# Patient Record
Sex: MALE | Race: WHITE | NOT HISPANIC OR LATINO | Employment: OTHER | URBAN - METROPOLITAN AREA
[De-identification: names, ages, dates, MRNs, and addresses within clinical notes are randomized per-mention and may not be internally consistent; named-entity substitution may affect disease eponyms.]

---

## 2022-02-10 ENCOUNTER — TELEPHONE (OUTPATIENT)
Dept: HEALTH INFORMATION MANAGEMENT | Facility: OTHER | Age: 72
End: 2022-02-10

## 2022-02-10 NOTE — TELEPHONE ENCOUNTER
Person at this # is not accepting calls at this time-   Called PT to schedule COMPREHENSIVE HEALTH ASSESSMENT, unable to LVM

## 2022-08-02 ENCOUNTER — TELEPHONE (OUTPATIENT)
Dept: HEALTH INFORMATION MANAGEMENT | Facility: OTHER | Age: 72
End: 2022-08-02

## 2022-11-09 ENCOUNTER — DOCUMENTATION (OUTPATIENT)
Dept: HEALTH INFORMATION MANAGEMENT | Facility: OTHER | Age: 72
End: 2022-11-09
Payer: MEDICARE

## 2023-05-05 ENCOUNTER — TELEPHONE (OUTPATIENT)
Dept: HEALTH INFORMATION MANAGEMENT | Facility: OTHER | Age: 73
End: 2023-05-05
Payer: MEDICARE

## 2023-06-16 PROBLEM — I77.811 AORTIC ECTASIA, ABDOMINAL (HCC): Status: ACTIVE | Noted: 2023-06-16

## 2023-06-16 PROBLEM — I72.3 ANEURYSM OF INTERNAL ILIAC ARTERY (HCC): Status: ACTIVE | Noted: 2023-06-16

## 2023-06-16 PROBLEM — R12 HEARTBURN: Status: ACTIVE | Noted: 2023-06-16

## 2023-06-16 PROBLEM — Z87.19 HISTORY OF GI BLEED: Status: ACTIVE | Noted: 2023-06-16

## 2023-06-16 PROBLEM — H93.19 TINNITUS: Status: ACTIVE | Noted: 2023-06-16

## 2023-06-16 PROBLEM — I70.0 AORTIC ATHEROSCLEROSIS (HCC): Status: ACTIVE | Noted: 2023-06-16

## 2023-06-16 PROBLEM — E66.9 CLASS 1 OBESITY IN ADULT: Status: ACTIVE | Noted: 2023-06-16

## 2023-06-16 PROBLEM — E66.811 CLASS 1 OBESITY IN ADULT: Status: ACTIVE | Noted: 2023-06-16

## 2023-06-16 PROBLEM — I73.00 RAYNAUD'S DISEASE WITHOUT GANGRENE: Status: ACTIVE | Noted: 2023-06-16

## 2023-07-13 ENCOUNTER — TELEPHONE (OUTPATIENT)
Dept: MEDICAL GROUP | Facility: LAB | Age: 73
End: 2023-07-13
Payer: MEDICARE

## 2023-07-13 RX ORDER — POLYETHYLENE GLYCOL 3350 17 G/17G
17 POWDER, FOR SOLUTION ORAL
COMMUNITY
Start: 2023-02-08 | End: 2023-07-13

## 2023-07-13 RX ORDER — LANOLIN ALCOHOL/MO/W.PET/CERES
1 CREAM (GRAM) TOPICAL 2 TIMES DAILY WITH MEALS
COMMUNITY
Start: 2023-02-08 | End: 2023-07-13

## 2023-07-13 NOTE — TELEPHONE ENCOUNTER
NEW PATIENT VISIT PRE-VISIT PLANNING    1.  [employer] Patient is checked in Patient Demographics?Yes    2.  Immunizations were updated in Epic using Reconcile Outside Information activity? No, failed         3.  Is this appointment scheduled as a Hospital Follow-Up? No    4.  Patient is due for the following Health Maintenance Topics:   Health Maintenance Due   Topic Date Due    HEPATITIS C SCREENING  Never done    COVID-19 Vaccine (1) Never done    IMM DTaP/Tdap/Td Vaccine (1 - Tdap) Never done    IMM ZOSTER VACCINES (1 of 2) Never done    IMM PNEUMOCOCCAL VACCINE: 65+ Years (1 - PCV) Never done     5.  Reviewed/Updated the following with patient:          Preferred Pharmacy? Yes           Preferred Lab? Yes           Preferred Communication? Yes           Allergies? Yes           Medications? Yes, abstract     6.  Updated Care Team?          DME Company (gait device, O2, CPAP, etc.) N/A          Other Specialists (eye doctor, derm, GYN, cardiology, endo, etc): Yes     7.  AHA (Puls8) form printed for Provider? N/A

## 2023-07-13 NOTE — TELEPHONE ENCOUNTER
Left message for patient to call back regarding pre-visit planning. Please transfer call to [phone].

## 2023-07-20 SDOH — ECONOMIC STABILITY: INCOME INSECURITY: HOW HARD IS IT FOR YOU TO PAY FOR THE VERY BASICS LIKE FOOD, HOUSING, MEDICAL CARE, AND HEATING?: SOMEWHAT HARD

## 2023-07-20 SDOH — ECONOMIC STABILITY: FOOD INSECURITY: WITHIN THE PAST 12 MONTHS, THE FOOD YOU BOUGHT JUST DIDN'T LAST AND YOU DIDN'T HAVE MONEY TO GET MORE.: NEVER TRUE

## 2023-07-20 SDOH — ECONOMIC STABILITY: TRANSPORTATION INSECURITY
IN THE PAST 12 MONTHS, HAS LACK OF TRANSPORTATION KEPT YOU FROM MEETINGS, WORK, OR FROM GETTING THINGS NEEDED FOR DAILY LIVING?: NO

## 2023-07-20 SDOH — ECONOMIC STABILITY: HOUSING INSECURITY: IN THE LAST 12 MONTHS, HOW MANY PLACES HAVE YOU LIVED?: 1

## 2023-07-20 SDOH — ECONOMIC STABILITY: TRANSPORTATION INSECURITY
IN THE PAST 12 MONTHS, HAS THE LACK OF TRANSPORTATION KEPT YOU FROM MEDICAL APPOINTMENTS OR FROM GETTING MEDICATIONS?: NO

## 2023-07-20 SDOH — HEALTH STABILITY: MENTAL HEALTH
STRESS IS WHEN SOMEONE FEELS TENSE, NERVOUS, ANXIOUS, OR CAN'T SLEEP AT NIGHT BECAUSE THEIR MIND IS TROUBLED. HOW STRESSED ARE YOU?: NOT AT ALL

## 2023-07-20 SDOH — ECONOMIC STABILITY: TRANSPORTATION INSECURITY
IN THE PAST 12 MONTHS, HAS LACK OF RELIABLE TRANSPORTATION KEPT YOU FROM MEDICAL APPOINTMENTS, MEETINGS, WORK OR FROM GETTING THINGS NEEDED FOR DAILY LIVING?: NO

## 2023-07-20 SDOH — ECONOMIC STABILITY: HOUSING INSECURITY
IN THE LAST 12 MONTHS, WAS THERE A TIME WHEN YOU DID NOT HAVE A STEADY PLACE TO SLEEP OR SLEPT IN A SHELTER (INCLUDING NOW)?: NO

## 2023-07-20 SDOH — ECONOMIC STABILITY: FOOD INSECURITY: WITHIN THE PAST 12 MONTHS, YOU WORRIED THAT YOUR FOOD WOULD RUN OUT BEFORE YOU GOT MONEY TO BUY MORE.: NEVER TRUE

## 2023-07-20 SDOH — HEALTH STABILITY: PHYSICAL HEALTH: ON AVERAGE, HOW MANY DAYS PER WEEK DO YOU ENGAGE IN MODERATE TO STRENUOUS EXERCISE (LIKE A BRISK WALK)?: 7 DAYS

## 2023-07-20 SDOH — ECONOMIC STABILITY: INCOME INSECURITY: IN THE LAST 12 MONTHS, WAS THERE A TIME WHEN YOU WERE NOT ABLE TO PAY THE MORTGAGE OR RENT ON TIME?: NO

## 2023-07-20 SDOH — HEALTH STABILITY: PHYSICAL HEALTH: ON AVERAGE, HOW MANY MINUTES DO YOU ENGAGE IN EXERCISE AT THIS LEVEL?: 150+ MIN

## 2023-07-20 ASSESSMENT — SOCIAL DETERMINANTS OF HEALTH (SDOH)
HOW OFTEN DO YOU HAVE A DRINK CONTAINING ALCOHOL: MONTHLY OR LESS
HOW OFTEN DO YOU HAVE SIX OR MORE DRINKS ON ONE OCCASION: NEVER
HOW HARD IS IT FOR YOU TO PAY FOR THE VERY BASICS LIKE FOOD, HOUSING, MEDICAL CARE, AND HEATING?: SOMEWHAT HARD
DO YOU BELONG TO ANY CLUBS OR ORGANIZATIONS SUCH AS CHURCH GROUPS UNIONS, FRATERNAL OR ATHLETIC GROUPS, OR SCHOOL GROUPS?: YES
WITHIN THE PAST 12 MONTHS, YOU WORRIED THAT YOUR FOOD WOULD RUN OUT BEFORE YOU GOT THE MONEY TO BUY MORE: NEVER TRUE
HOW OFTEN DO YOU ATTENT MEETINGS OF THE CLUB OR ORGANIZATION YOU BELONG TO?: 1 TO 4 TIMES PER YEAR
HOW OFTEN DO YOU ATTEND CHURCH OR RELIGIOUS SERVICES?: MORE THAN 4 TIMES PER YEAR
HOW OFTEN DO YOU GET TOGETHER WITH FRIENDS OR RELATIVES?: ONCE A WEEK
HOW OFTEN DO YOU ATTENT MEETINGS OF THE CLUB OR ORGANIZATION YOU BELONG TO?: 1 TO 4 TIMES PER YEAR
DO YOU BELONG TO ANY CLUBS OR ORGANIZATIONS SUCH AS CHURCH GROUPS UNIONS, FRATERNAL OR ATHLETIC GROUPS, OR SCHOOL GROUPS?: YES
HOW MANY DRINKS CONTAINING ALCOHOL DO YOU HAVE ON A TYPICAL DAY WHEN YOU ARE DRINKING: 1 OR 2
HOW OFTEN DO YOU GET TOGETHER WITH FRIENDS OR RELATIVES?: ONCE A WEEK
IN A TYPICAL WEEK, HOW MANY TIMES DO YOU TALK ON THE PHONE WITH FAMILY, FRIENDS, OR NEIGHBORS?: MORE THAN THREE TIMES A WEEK
IN A TYPICAL WEEK, HOW MANY TIMES DO YOU TALK ON THE PHONE WITH FAMILY, FRIENDS, OR NEIGHBORS?: MORE THAN THREE TIMES A WEEK
HOW OFTEN DO YOU ATTEND CHURCH OR RELIGIOUS SERVICES?: MORE THAN 4 TIMES PER YEAR

## 2023-07-20 ASSESSMENT — LIFESTYLE VARIABLES
AUDIT-C TOTAL SCORE: 1
HOW OFTEN DO YOU HAVE SIX OR MORE DRINKS ON ONE OCCASION: NEVER
SKIP TO QUESTIONS 9-10: 1
HOW OFTEN DO YOU HAVE A DRINK CONTAINING ALCOHOL: MONTHLY OR LESS
HOW MANY STANDARD DRINKS CONTAINING ALCOHOL DO YOU HAVE ON A TYPICAL DAY: 1 OR 2

## 2023-07-21 ENCOUNTER — OFFICE VISIT (OUTPATIENT)
Dept: MEDICAL GROUP | Facility: LAB | Age: 73
End: 2023-07-21
Payer: MEDICARE

## 2023-07-21 VITALS
HEART RATE: 62 BPM | RESPIRATION RATE: 12 BRPM | SYSTOLIC BLOOD PRESSURE: 142 MMHG | OXYGEN SATURATION: 98 % | DIASTOLIC BLOOD PRESSURE: 90 MMHG | BODY MASS INDEX: 31.55 KG/M2 | WEIGHT: 213 LBS | TEMPERATURE: 97.1 F | HEIGHT: 69 IN

## 2023-07-21 DIAGNOSIS — Z76.89 ESTABLISHING CARE WITH NEW DOCTOR, ENCOUNTER FOR: ICD-10-CM

## 2023-07-21 DIAGNOSIS — R23.8 OTHER SKIN CHANGES: ICD-10-CM

## 2023-07-21 DIAGNOSIS — Z13.6 SCREENING FOR HEART DISEASE: ICD-10-CM

## 2023-07-21 DIAGNOSIS — E78.5 DYSLIPIDEMIA: ICD-10-CM

## 2023-07-21 DIAGNOSIS — I70.0 AORTIC ATHEROSCLEROSIS (HCC): ICD-10-CM

## 2023-07-21 DIAGNOSIS — E55.9 VITAMIN D DEFICIENCY: ICD-10-CM

## 2023-07-21 PROCEDURE — 3080F DIAST BP >= 90 MM HG: CPT | Performed by: INTERNAL MEDICINE

## 2023-07-21 PROCEDURE — 99204 OFFICE O/P NEW MOD 45 MIN: CPT | Performed by: INTERNAL MEDICINE

## 2023-07-21 PROCEDURE — 3077F SYST BP >= 140 MM HG: CPT | Performed by: INTERNAL MEDICINE

## 2023-07-21 ASSESSMENT — FIBROSIS 4 INDEX: FIB4 SCORE: 1.57

## 2023-07-21 NOTE — PROGRESS NOTES
CC: Elvis Landaverde is a 73 y.o. male is suffering from   Chief Complaint   Patient presents with    Establish Care         SUBJECTIVE:  1. Establishing care with new doctor, encounter for  Tarik is here for follow-up, we have discussed that he is doing reasonably well.  Patient has been seen 20 years prior as a patient.    2. Dyslipidemia  History of dyslipidemia recheck labs    3. Aortic atherosclerosis (HCC)  CT cardiac scoring ordered    4. Screening for heart disease  CT cardiac scoring ordered    5. Vitamin D deficiency  Recheck vitamin D    6. Other skin changes  Referral written to dermatology        Past social, family, history:  ,     MEDICATIONS:    Current Outpatient Medications:     Multiple Vitamin (MULTIVITAMIN ADULT PO), Take 1 Tablet by mouth every day., Disp: , Rfl:     Calcium Carb-Cholecalciferol (CALCIUM PLUS VITAMIN D3 PO), Take  by mouth. Once Daily, Disp: , Rfl:     Ascorbic Acid (VITAMIN C) Powder, Take  by mouth. Once Daily, Disp: , Rfl:     PROBLEMS:  Patient Active Problem List    Diagnosis Date Noted    BMI 32.0-32.9,adult 06/16/2023    Class 1 obesity in adult 06/16/2023    Aortic atherosclerosis (HCC) 06/16/2023    Aortic ectasia, abdominal (HCC) 06/16/2023    Aneurysm of internal iliac artery (HCC) 06/16/2023    Heartburn 06/16/2023    Raynaud's disease without gangrene 06/16/2023    Tinnitus 06/16/2023    History of GI bleed 06/16/2023       REVIEW OF SYSTEMS:  General: Patient denies any problems with nausea vomiting fever chills chest pain or tightness.  Head:  No history of strokes seizures severe head trauma or loss of consciousness.   HEENT: No history of any significant vision loss, hearing loss, changes in sense of smell hoarseness  Heart: No chest pain tightness squeezing.   Lungs: No recurrent asthma bronchitis pneumonia.   Gastrointestinal: No history of black or bloody stools or  Constipation colonoscopy was done.  Genitourinary:  No increased  "frequency urgency pain and burning with urination  Musculoskeletal: No joint pain or discomfort.   Skin: No skin changes      PHYSICAL EXAM   Constitutional: Alert, cooperative, not in acute distress.  Cardiovascular:  Rate Rhythm is regular without murmurs rubs clicks.     Thorax & Lungs: Clear to auscultation, no wheezing, rhonchi, or rales  HENT: Normocephalic, Atraumatic.  Eyes: PERRLA, EOMI, Conjunctiva normal.   Neck: Trachia is midline no swelling of the thyroid.   Lymphatic: No lymphadenopathy noted.   Abdomin: Soft non-tender, no rebound, no guarding.   Skin: Warm, Dry, No erythema, No rash.   Extremities: Atraumatic with symmetric distal pulses, No edema, No tenderness, No cyanosis, No clubbing.   Musculoskeletal: Extremities without difficulty  Neurologic: Alert & oriented x 3, cranial nerves II through XII are intact, Normal motor function, Normal sensory function, No focal deficits noted.   Psychiatric: Affect normal, Judgment normal, Mood normal.     VITAL SIGNS:BP (!) 142/90   Pulse 62   Temp 36.2 °C (97.1 °F) (Temporal)   Resp 12   Ht 1.753 m (5' 9\")   Wt 96.6 kg (213 lb)   SpO2 98%   BMI 31.45 kg/m²     Labs: Reviewed    Assessment:                                                     Plan:    1. Establishing care with new doctor, encounter for  There is to be medically stable    2. Dyslipidemia  Recheck lipid profile  - CBC WITH DIFFERENTIAL; Future  - Comp Metabolic Panel; Future  - Lipid Profile; Future    3. Aortic atherosclerosis (HCC)  Labs ordered  - CBC WITH DIFFERENTIAL; Future  - Comp Metabolic Panel; Future  - Lipid Profile; Future    4. Screening for heart disease  CT cardiac scoring ordered  - CT-CARDIAC SCORING; Future    5. Vitamin D deficiency  Recheck vitamin D  - VITAMIN D,25 HYDROXY (DEFICIENCY); Future    6. Other skin changes  Skin changes lower back  - Referral to Dermatology      "

## 2023-08-30 ENCOUNTER — HOSPITAL ENCOUNTER (OUTPATIENT)
Dept: RADIOLOGY | Facility: MEDICAL CENTER | Age: 73
End: 2023-08-30
Attending: INTERNAL MEDICINE
Payer: COMMERCIAL

## 2023-08-30 DIAGNOSIS — R93.1 AGATSTON CAC SCORE, >400: ICD-10-CM

## 2023-08-30 DIAGNOSIS — Z13.6 SCREENING FOR HEART DISEASE: ICD-10-CM

## 2023-08-30 PROCEDURE — 4410556 CT-CARDIAC SCORING (SELF PAY ONLY)

## 2023-08-30 RX ORDER — ROSUVASTATIN CALCIUM 20 MG/1
20 TABLET, COATED ORAL EVERY EVENING
Qty: 90 TABLET | Refills: 3 | Status: SHIPPED | OUTPATIENT
Start: 2023-08-30 | End: 2023-09-06 | Stop reason: SDUPTHER

## 2023-09-06 ENCOUNTER — OFFICE VISIT (OUTPATIENT)
Dept: DERMATOLOGY | Facility: IMAGING CENTER | Age: 73
End: 2023-09-06
Payer: MEDICARE

## 2023-09-06 DIAGNOSIS — D48.9 NEOPLASM OF UNCERTAIN BEHAVIOR: ICD-10-CM

## 2023-09-06 DIAGNOSIS — R93.1 AGATSTON CAC SCORE, >400: ICD-10-CM

## 2023-09-06 PROCEDURE — 11102 TANGNTL BX SKIN SINGLE LES: CPT | Performed by: NURSE PRACTITIONER

## 2023-09-06 RX ORDER — ROSUVASTATIN CALCIUM 20 MG/1
20 TABLET, COATED ORAL EVERY EVENING
Qty: 100 TABLET | Refills: 3 | Status: SHIPPED | OUTPATIENT
Start: 2023-09-06 | End: 2024-03-19 | Stop reason: SDUPTHER

## 2023-09-06 NOTE — PROGRESS NOTES
"DERMATOLOGY NOTE  NEW VISIT       Chief complaint: Establish Care and Cyst     HPI/location: cyst on back   Time present: \"while\"  Painful lesion: No  Itching lesion: No  Enlarging lesion: No  Anything make it better or worse?      History of skin cancer: No  History of precancers/actinic keratoses: No  History of biopsies:No  History of blistering/severe sunburns:Yes, Details: Multiple, used to be a    Family history of skin cancer:No  Family history of atypical moles:No    Allergies   Allergen Reactions    Covid-19 (Mrna) Vaccine      Allergy         MEDICATIONS:  Medications relevant to specialty reviewed.     REVIEW OF SYSTEMS:   Positive for skin (see HPI)  Negative for fevers and chills       EXAM:  There were no vitals taken for this visit.  Constitutional: Well-developed, well-nourished, and in no distress.     A focused skin exam was performed including the affected areas of the back. Notable findings on exam today listed below and/or in assessment/plan.     1.5 cm pink superficial cystic structure to L lower back    IMPRESSION / PLAN:    1. Neoplasm of uncertain behavior  Procedure Note   Procedure: Biopsy by shave technique  Location: L lower back  Size: as noted in exam  Preoperative diagnosis:acrochordon vs cyst vs other  Risks, benefits and alternatives of procedure discussed, verbal consent obtained for photo (see chart) and written informed consent obtained for procedure. Time out completed. Area of biopsy prepped with alcohol. Anesthesia with 1% lidocaine with epinephrine administered with 30 gauge needle. Shave biopsy of the site performed. Hemostasis achieved with pressure and aluminum chloride. Vaseline applied to wound with bandage. Patient tolerated procedure well and there were no complications. The specimen was sent to the pathology lab by the staff. Wound care was discussed.        Discussed risks associated with shave bx,   Patient verbalized understanding and agrees with plan " regarding the above        Please note that this dictation was created using voice recognition software. I have made every reasonable attempt to correct obvious errors, but I expect that there are errors of grammar and possibly content that I did not discover before finalizing the note.      Return to clinic in: Return for Pending Bx results. and as needed for any new or changing skin lesions.

## 2023-09-12 ENCOUNTER — TELEPHONE (OUTPATIENT)
Dept: DERMATOLOGY | Facility: IMAGING CENTER | Age: 73
End: 2023-09-12
Payer: MEDICARE

## 2023-10-11 ENCOUNTER — TELEPHONE (OUTPATIENT)
Dept: MEDICAL GROUP | Facility: LAB | Age: 73
End: 2023-10-11
Payer: MEDICARE

## 2023-10-11 NOTE — TELEPHONE ENCOUNTER
ESTABLISHED PATIENT PRE-VISIT PLANNING     Patient was contacted to complete PVP.     Note: Patient will not be contacted if there is no indication to call.     1.  Reviewed notes from the last few office visits within the medical group: Yes    2.  If any orders were placed at last visit or intended to be done for this visit (i.e. 6 mos follow-up), do we have Results/Consult Notes?           Labs - Labs ordered, NOT completed. Patient advised to complete prior to next appointment.  Note: If patient appointment is for lab review and patient did not complete labs, check with provider if OK to reschedule patient until labs completed.         Imaging - Imaging ordered, completed and results are in chart.         Referrals - Referral ordered, patient was seen and consult notes are in chart. Care Teams updated  YES.    3. Is this appointment scheduled as a Hospital Follow-Up? No    4.  Immunizations were updated in Epic using Reconcile Outside Information activity? Yes    5.  Patient is due for the following Health Maintenance Topics:   Health Maintenance Due   Topic Date Due    Hepatitis C Screening  Never done    COVID-19 Vaccine (1) Never done    IMM DTaP/Tdap/Td Vaccine (1 - Tdap) Never done    Zoster (Shingles) Vaccines (1 of 2) Never done    Pneumococcal Vaccine: 65+ Years (1 - PCV) Never done    Influenza Vaccine (1) Never done     6.  AHA (Pulse8) form printed for Provider? N/A

## 2023-10-18 ENCOUNTER — OFFICE VISIT (OUTPATIENT)
Dept: MEDICAL GROUP | Facility: LAB | Age: 73
End: 2023-10-18
Payer: MEDICARE

## 2023-10-18 VITALS
WEIGHT: 214 LBS | SYSTOLIC BLOOD PRESSURE: 148 MMHG | HEART RATE: 59 BPM | TEMPERATURE: 97.2 F | BODY MASS INDEX: 31.7 KG/M2 | OXYGEN SATURATION: 98 % | RESPIRATION RATE: 12 BRPM | DIASTOLIC BLOOD PRESSURE: 72 MMHG | HEIGHT: 69 IN

## 2023-10-18 DIAGNOSIS — I15.9 SECONDARY HYPERTENSION: ICD-10-CM

## 2023-10-18 DIAGNOSIS — R10.13 DYSPEPSIA: ICD-10-CM

## 2023-10-18 PROCEDURE — 99213 OFFICE O/P EST LOW 20 MIN: CPT | Performed by: INTERNAL MEDICINE

## 2023-10-18 PROCEDURE — 3078F DIAST BP <80 MM HG: CPT | Performed by: INTERNAL MEDICINE

## 2023-10-18 PROCEDURE — 3077F SYST BP >= 140 MM HG: CPT | Performed by: INTERNAL MEDICINE

## 2023-10-18 RX ORDER — IRBESARTAN 150 MG/1
150 TABLET ORAL NIGHTLY
Qty: 90 TABLET | Refills: 3 | Status: SHIPPED | OUTPATIENT
Start: 2023-10-18

## 2023-10-18 ASSESSMENT — FIBROSIS 4 INDEX: FIB4 SCORE: 1.78

## 2023-10-18 NOTE — PROGRESS NOTES
CC: Elvis Landaverde is a 73 y.o. male is suffering from No chief complaint on file.        SUBJECTIVE:  1. Dyspepsia  Tarik is here for follow-up has a history of intermittent dyspepsia states that he has some significant abdominal pain discomfort, denies any black or bloody stools    2. Secondary hypertension  History of secondary hypertension, will have patient start Avapro        Past social, family, history:  , retired research       MEDICATIONS:    Current Outpatient Medications:     irbesartan (AVAPRO) 150 MG Tab, Take 1 Tablet by mouth every evening., Disp: 90 Tablet, Rfl: 3    rosuvastatin (CRESTOR) 20 MG Tab, Take 1 Tablet by mouth every evening., Disp: 100 Tablet, Rfl: 3    Multiple Vitamin (MULTIVITAMIN ADULT PO), Take 1 Tablet by mouth every day., Disp: , Rfl:     Calcium Carb-Cholecalciferol (CALCIUM PLUS VITAMIN D3 PO), Take  by mouth. Once Daily, Disp: , Rfl:     Ascorbic Acid (VITAMIN C) Powder, Take  by mouth. Once Daily, Disp: , Rfl:     PROBLEMS:  Patient Active Problem List    Diagnosis Date Noted    BMI 32.0-32.9,adult 06/16/2023    Class 1 obesity in adult 06/16/2023    Aortic atherosclerosis (HCC) 06/16/2023    Aortic ectasia, abdominal (HCC) 06/16/2023    Aneurysm of internal iliac artery (HCC) 06/16/2023    Heartburn 06/16/2023    Raynaud's disease without gangrene 06/16/2023    Tinnitus 06/16/2023    History of GI bleed 06/16/2023       REVIEW OF SYSTEMS:  Gen.:  No Nausea, Vomiting, fever, Chills.  Heart: No chest pain.  Lungs:  No shortness of Breath.  Psychological: Wayne unusual Anxiety depression     PHYSICAL EXAM   Constitutional: Alert, cooperative, not in acute distress.  Cardiovascular:  Rate Rhythm is regular without murmurs rubs clicks.     Thorax & Lungs: Clear to auscultation, no wheezing, rhonchi, or rales  HENT: Normocephalic, Atraumatic.  Eyes: PERRLA, EOMI, Conjunctiva normal.   Neck: Trachia is midline no swelling of the thyroid.   Lymphatic: No  "lymphadenopathy noted.   Neurologic: Alert & oriented x 3, cranial nerves II through XII are intact, Normal motor function, Normal sensory function, No focal deficits noted.   Psychiatric: Affect normal, Judgment normal, Mood normal.     VITAL SIGNS:BP (!) 148/72   Pulse (!) 59   Temp 36.2 °C (97.2 °F) (Temporal)   Resp 12   Ht 1.753 m (5' 9\")   Wt 97.1 kg (214 lb)   SpO2 98%   BMI 31.60 kg/m²     Labs: Reviewed    Assessment:                                                     Plan:    1. Dyspepsia  Check stool for H. pylori  - H.PYLORI STOOL ANTIGEN; Future    2. Secondary hypertension  Start Avapro  - irbesartan (AVAPRO) 150 MG Tab; Take 1 Tablet by mouth every evening.  Dispense: 90 Tablet; Refill: 3        "

## 2024-03-19 DIAGNOSIS — R93.1 AGATSTON CAC SCORE, >400: ICD-10-CM

## 2024-03-19 RX ORDER — ROSUVASTATIN CALCIUM 20 MG/1
20 TABLET, COATED ORAL EVERY EVENING
Qty: 100 TABLET | Refills: 0 | Status: SHIPPED | OUTPATIENT
Start: 2024-03-19

## 2024-03-19 NOTE — TELEPHONE ENCOUNTER
Received request via: Pharmacy    Was the patient seen in the last year in this department? Yes    Does the patient have an active prescription (recently filled or refills available) for medication(s) requested? No    Pharmacy Name: [facility]    Does the patient have alf Plus and need 100 day supply (blood pressure, diabetes and cholesterol meds only)? Yes, quantity updated to 100 days

## 2024-04-15 ASSESSMENT — ACTIVITIES OF DAILY LIVING (ADL): BATHING_REQUIRES_ASSISTANCE: 0

## 2024-04-15 ASSESSMENT — PATIENT HEALTH QUESTIONNAIRE - PHQ9
1. LITTLE INTEREST OR PLEASURE IN DOING THINGS: NOT AT ALL
2. FEELING DOWN, DEPRESSED, IRRITABLE, OR HOPELESS: NOT AT ALL

## 2024-04-15 ASSESSMENT — ENCOUNTER SYMPTOMS: GENERAL WELL-BEING: GOOD

## 2024-04-16 ASSESSMENT — PATIENT HEALTH QUESTIONNAIRE - PHQ9: CLINICAL INTERPRETATION OF PHQ2 SCORE: 0

## 2024-04-16 NOTE — ASSESSMENT & PLAN NOTE
"Chronic, ongoing. Diagnosed via findings on January 2023 CT abdomen with findings of \"Stable partially thrombosed b/l internal iliac artery aneurysms, 28cm on L and 2.3cm on R.\" Follow up with PCP per routine for continued monitoring and management.   "

## 2024-04-16 NOTE — ASSESSMENT & PLAN NOTE
"Chronic, ongoing. Diagnosed via findings on January 2023 CT abdomen with findings of \"minimal ectasia infrarenal abdominal aortal.\" Follow up with PCP per routine for continued monitoring and management.  "

## 2024-04-16 NOTE — ASSESSMENT & PLAN NOTE
Chronic, stable. Diagnosis made following incidental finding of disease on imaging. He attempted rosuvastatin but states this caused memory issues for him. Encouraged Mediterranean diet and regular physical exercise. Follow up with PCP at least annually for continued monitoring.     CT abdomen 2/3/2023:  Scattered aortoiliac atherosclerosis. Stable partially thrombosed bilateral   internal iliac artery aneurysms, 2.8 cm on the left and 2.3 cm on the right  CT abdomen 1/31/2023  Vasculature: Atherosclerotic disease.

## 2024-04-17 ENCOUNTER — OFFICE VISIT (OUTPATIENT)
Dept: MEDICAL GROUP | Facility: LAB | Age: 74
End: 2024-04-17
Payer: MEDICARE

## 2024-04-17 VITALS
OXYGEN SATURATION: 98 % | DIASTOLIC BLOOD PRESSURE: 88 MMHG | HEIGHT: 69 IN | RESPIRATION RATE: 18 BRPM | HEART RATE: 63 BPM | TEMPERATURE: 96.9 F | BODY MASS INDEX: 32.73 KG/M2 | SYSTOLIC BLOOD PRESSURE: 132 MMHG | WEIGHT: 221 LBS

## 2024-04-17 VITALS
BODY MASS INDEX: 33.03 KG/M2 | DIASTOLIC BLOOD PRESSURE: 68 MMHG | WEIGHT: 223 LBS | HEIGHT: 69 IN | SYSTOLIC BLOOD PRESSURE: 132 MMHG

## 2024-04-17 DIAGNOSIS — E66.09 CLASS 1 OBESITY DUE TO EXCESS CALORIES WITH SERIOUS COMORBIDITY AND BODY MASS INDEX (BMI) OF 32.0 TO 32.9 IN ADULT: ICD-10-CM

## 2024-04-17 DIAGNOSIS — I70.0 AORTIC ATHEROSCLEROSIS (HCC): ICD-10-CM

## 2024-04-17 DIAGNOSIS — G89.29 CHRONIC LEFT SHOULDER PAIN: ICD-10-CM

## 2024-04-17 DIAGNOSIS — I25.10 CORONARY ARTERY CALCIFICATION SEEN ON CT SCAN: ICD-10-CM

## 2024-04-17 DIAGNOSIS — I77.811 AORTIC ECTASIA, ABDOMINAL (HCC): ICD-10-CM

## 2024-04-17 DIAGNOSIS — I72.3 ANEURYSM OF INTERNAL ILIAC ARTERY (HCC): ICD-10-CM

## 2024-04-17 DIAGNOSIS — M25.512 CHRONIC LEFT SHOULDER PAIN: ICD-10-CM

## 2024-04-17 PROCEDURE — 1126F AMNT PAIN NOTED NONE PRSNT: CPT | Performed by: PHYSICIAN ASSISTANT

## 2024-04-17 PROCEDURE — 3078F DIAST BP <80 MM HG: CPT | Performed by: PHYSICIAN ASSISTANT

## 2024-04-17 PROCEDURE — 99213 OFFICE O/P EST LOW 20 MIN: CPT | Performed by: INTERNAL MEDICINE

## 2024-04-17 PROCEDURE — G0439 PPPS, SUBSEQ VISIT: HCPCS | Performed by: PHYSICIAN ASSISTANT

## 2024-04-17 PROCEDURE — 3075F SYST BP GE 130 - 139MM HG: CPT | Performed by: PHYSICIAN ASSISTANT

## 2024-04-17 PROCEDURE — 3079F DIAST BP 80-89 MM HG: CPT | Performed by: INTERNAL MEDICINE

## 2024-04-17 PROCEDURE — 3075F SYST BP GE 130 - 139MM HG: CPT | Performed by: INTERNAL MEDICINE

## 2024-04-17 SDOH — ECONOMIC STABILITY: FOOD INSECURITY: WITHIN THE PAST 12 MONTHS, THE FOOD YOU BOUGHT JUST DIDN'T LAST AND YOU DIDN'T HAVE MONEY TO GET MORE.: NEVER TRUE

## 2024-04-17 SDOH — ECONOMIC STABILITY: FOOD INSECURITY: WITHIN THE PAST 12 MONTHS, YOU WORRIED THAT YOUR FOOD WOULD RUN OUT BEFORE YOU GOT MONEY TO BUY MORE.: NEVER TRUE

## 2024-04-17 SDOH — ECONOMIC STABILITY: INCOME INSECURITY: IN THE LAST 12 MONTHS, WAS THERE A TIME WHEN YOU WERE NOT ABLE TO PAY THE MORTGAGE OR RENT ON TIME?: NO

## 2024-04-17 SDOH — HEALTH STABILITY: PHYSICAL HEALTH: ON AVERAGE, HOW MANY DAYS PER WEEK DO YOU ENGAGE IN MODERATE TO STRENUOUS EXERCISE (LIKE A BRISK WALK)?: 7 DAYS

## 2024-04-17 SDOH — HEALTH STABILITY: PHYSICAL HEALTH: ON AVERAGE, HOW MANY MINUTES DO YOU ENGAGE IN EXERCISE AT THIS LEVEL?: 30 MIN

## 2024-04-17 SDOH — ECONOMIC STABILITY: INCOME INSECURITY: HOW HARD IS IT FOR YOU TO PAY FOR THE VERY BASICS LIKE FOOD, HOUSING, MEDICAL CARE, AND HEATING?: NOT HARD AT ALL

## 2024-04-17 ASSESSMENT — FIBROSIS 4 INDEX
FIB4 SCORE: 1.658455965793542097
FIB4 SCORE: 1.658455965793542097

## 2024-04-17 ASSESSMENT — PAIN SCALES - GENERAL: PAINLEVEL: NO PAIN

## 2024-04-17 NOTE — ASSESSMENT & PLAN NOTE
Chronic issue diagnosed via findings on 8/2023 CT calcium score with total at 3429. He attempted rosuvastatin and reports it caused memory issues. He report his lipid panels have otherwise been normal. Follow up with PCP for continued monitoring and management.     Coronary calcification:  LMA - 0.0  LCX - 59.8  LAD - 2286.4  RCA - 1082.9   Total Calcium Score: 3429.2

## 2024-04-17 NOTE — PROGRESS NOTES
Comprehensive Health Assessment Program     Elivs Landaverde is a 74 y.o. here for his comprehensive health assessment.    Patient Active Problem List    Diagnosis Date Noted    Coronary artery calcification seen on CT scan 04/17/2024    Class 1 obesity due to excess calories with serious comorbidity and body mass index (BMI) of 32.0 to 32.9 in adult 06/16/2023    Aortic atherosclerosis (HCC) 06/16/2023    Aortic ectasia, abdominal (HCC) 06/16/2023    Aneurysm of internal iliac artery (HCC) 06/16/2023    Heartburn 06/16/2023    Raynaud's disease without gangrene 06/16/2023    Tinnitus 06/16/2023    History of GI bleed 06/16/2023       Current Outpatient Medications   Medication Sig Dispense Refill    Multiple Vitamin (MULTIVITAMIN ADULT PO) Take 1 Tablet by mouth every day.      Calcium Carb-Cholecalciferol (CALCIUM PLUS VITAMIN D3 PO) Take  by mouth. Once Daily      Ascorbic Acid (VITAMIN C) Powder Take  by mouth. Once Daily       No current facility-administered medications for this visit.          Current supplements as per medication list.     Allergies:   Covid-19 (mrna) vaccine  Social History     Tobacco Use    Smoking status: Never    Smokeless tobacco: Never   Vaping Use    Vaping Use: Never used   Substance Use Topics    Alcohol use: Yes     Comment: 1 time per month    Drug use: Never     History reviewed. No pertinent family history.  Elvis  has no past medical history on file.   History reviewed. No pertinent surgical history.    Screening:  In the last six months have you experienced any leakage of urine? No    Depression Screening  Little interest or pleasure in doing things?  0 - not at all  Feeling down, depressed , or hopeless? 0 - not at all  Patient Health Questionnaire Score: 0     If depressive symptoms identified deferred to follow up visit unless specifically addressed in assessment and plan.    Interpretation of PHQ-9 Total Score   Score Severity   1-4 No Depression   5-9 Mild  Depression   10-14 Moderate Depression   15-19 Moderately Severe Depression   20-27 Severe Depression    Screening for Cognitive Impairment  Do you or any of your friends or family members have any concern about your memory? No  Three Minute Recall (Leader, Season, Table) 3/3    Juanito clock face with all 12 numbers and set the hands to show 10 minutes after 11.  Yes 5  Cognitive concerns identified deferred for follow up unless specifically addressed in assessment and plan.    Fall Risk Assessment  Has the patient had two or more falls in the last year or any fall with injury in the last year?  No    Safety Assessment  Do you always wear your seatbelt?  Yes  Any changes to home needed to function safely? No  Difficulty hearing.  No  Patient counseled about all safety risks that were identified.    Functional Assessment ADLs  Are there any barriers preventing you from cooking for yourself or meeting nutritional needs?  No.    Are there any barriers preventing you from driving safely or obtaining transportation?  No.    Are there any barriers preventing you from using a telephone or calling for help?  No    Are there any barriers preventing you from shopping?  No.    Are there any barriers preventing you from taking care of your own finances?  No    Are there any barriers preventing you from managing your medications?  No    Are there any barriers preventing you from showering, bathing or dressing yourself? No    Are there any barriers preventing you from doing housework or laundry? No  Are there any barriers preventing you from using the toilet?No  Are you currently engaging in any exercise or physical activity?  Yes. Walks    Self-Assessment of Health  What is your perception of your health? Good  Do you sleep more than six hours a night? Yes  In the past 7 days, how much did pain keep you from doing your normal work? None  Do you spend quality time with family or friends (virtually or in person)? Yes  Do you usually  eat a heart healthy diet that constists of a variety of fruits, vegetables, whole grains and fiber? Yes  Do you eat foods high in fat and/or Fast Food more than three times per week? No    Advance Care Planning  Do you have an Advance Directive, Living Will, Durable Power of , or POLST? No  Provided patient with educational brochure regarding Advance Care Planning.                    Health Maintenance Summary            Overdue - Hepatitis C Screening (Once) Never done      No completion history exists for this topic.              Overdue - Zoster (Shingles) Vaccines (1 of 2) Never done      No completion history exists for this topic.              Overdue - Pneumococcal Vaccine: 65+ Years (1 of 1 - PCV) Never done      No completion history exists for this topic.              Overdue - COVID-19 Vaccine (1 - 2023-24 season) Never done      No completion history exists for this topic.              Influenza Vaccine (Season Ended) Next due on 9/1/2024      No completion history exists for this topic.              Annual Wellness Visit (Yearly) Next due on 4/17/2025 04/17/2024  Level of Service: [place] ANNUAL WELLNESS VISIT-INCLUDES PPPS SUBSEQUE*    06/16/2023  Level of Service: [place] ANNUAL WELLNESS VISIT-INCLUDES PPPS SUBSEQUE*              IMM DTaP/Tdap/Td Vaccine (2 - Td or Tdap) Next due on 12/21/2033 12/21/2023  Imm Admin: Tdap Vaccine              Hepatitis A Vaccine (Hep A) (Series Information) Aged Out      No completion history exists for this topic.              Hepatitis B Vaccine (Hep B) (Series Information) Aged Out      No completion history exists for this topic.              HPV Vaccines (Series Information) Aged Out      No completion history exists for this topic.              Polio Vaccine (Inactivated Polio) (Series Information) Aged Out      No completion history exists for this topic.              Meningococcal Immunization (Series Information) Aged Out      No completion history  "exists for this topic.              Discontinued - Colorectal Cancer Screening  Discontinued        Frequency changed to Never automatically (Topic No Longer Applies)    02/05/2023  Colon Cancer Screening Annual FIT (Done - [facility] in 02/2023)                    Patient Care Team:  Osvaldo Barbosa D.O. as PCP - General (Internal Medicine)  ERON Rosado (Dermatology)  Shakira Landon as Patient Advocate (Pharmacy)      Financial Resource Strain: Low Risk  (4/17/2024)    Overall Financial Resource Strain (CARDIA)     Difficulty of Paying Living Expenses: Not hard at all      Transportation Needs: No Transportation Needs (4/17/2024)    PRAPARE - Transportation     Lack of Transportation (Medical): No     Lack of Transportation (Non-Medical): No      Food Insecurity: No Food Insecurity (4/17/2024)    Hunger Vital Sign     Worried About Running Out of Food in the Last Year: Never true     Ran Out of Food in the Last Year: Never true        Encounter Vitals  Blood Pressure : 132/68  Weight: 101 kg (223 lb)  Height: 175.3 cm (5' 9\")  BMI (Calculated): 32.93  Pain Score: No pain     Alert, oriented in no acute distress.  Eye contact is good, speech goal directed, affect calm.    Assessment and Plan. The following treatment and monitoring plan is recommended:  Aortic atherosclerosis (HCC)  Chronic, stable. Diagnosis made following incidental finding of disease on imaging. He attempted rosuvastatin but states this caused memory issues for him. Encouraged Mediterranean diet and regular physical exercise. Follow up with PCP at least annually for continued monitoring.     CT abdomen 2/3/2023:  Scattered aortoiliac atherosclerosis. Stable partially thrombosed bilateral   internal iliac artery aneurysms, 2.8 cm on the left and 2.3 cm on the right  CT abdomen 1/31/2023  Vasculature: Atherosclerotic disease.     Aortic ectasia, abdominal (HCC)  Chronic, ongoing. Diagnosed via " "findings on January 2023 CT abdomen with findings of \"minimal ectasia infrarenal abdominal aortal.\" Follow up with PCP per routine for continued monitoring and management.    Aneurysm of internal iliac artery (HCC)  Chronic, ongoing. Diagnosed via findings on January 2023 CT abdomen with findings of \"Stable partially thrombosed b/l internal iliac artery aneurysms, 28cm on L and 2.3cm on R.\" Follow up with PCP per routine for continued monitoring and management.     Class 1 obesity due to excess calories with serious comorbidity and body mass index (BMI) of 32.0 to 32.9 in adult  Chronic, ongoing. BMI today 32.93. Associated with elevated CT calcium score. Encourage healthy calorie conscious diet with regular physical activity. Follow up with PCP at least annually for continued monitoring and management.    Coronary artery calcification seen on CT scan  Chronic issue diagnosed via findings on 8/2023 CT calcium score with total at 3429. He attempted rosuvastatin and reports it caused memory issues. He report his lipid panels have otherwise been normal. Follow up with PCP for continued monitoring and management.     Coronary calcification:  LMA - 0.0  LCX - 59.8  LAD - 2286.4  RCA - 1082.9   Total Calcium Score: 3429.2    Services suggested: No services needed at this time  Health Care Screening: Age-appropriate preventive services recommended by USPTF and ACIP covered by Medicare were discussed today. Services ordered if indicated and agreed upon by the patient.  Referrals offered: Community-based lifestyle interventions to reduce health risks and promote self-management and wellness, fall prevention, nutrition, physical activity, tobacco-use cessation, weight loss, and mental health services as per orders if indicated.    Discussion today about general wellness and lifestyle habits:    Prevent falls and reduce trip hazards; Cautioned about securing or removing rugs.  Have a working fire alarm and carbon monoxide " detector.  Engage in regular physical activity and social activities.    Follow-up: Return for follow up visit with PCP as previously scheduled.
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
## 2024-04-17 NOTE — ASSESSMENT & PLAN NOTE
Chronic, ongoing. BMI today 32.93. Associated with elevated CT calcium score. Encourage healthy calorie conscious diet with regular physical activity. Follow up with PCP at least annually for continued monitoring and management.

## 2024-05-21 ENCOUNTER — HOSPITAL ENCOUNTER (OUTPATIENT)
Dept: RADIOLOGY | Facility: MEDICAL CENTER | Age: 74
End: 2024-05-21
Attending: INTERNAL MEDICINE
Payer: MEDICARE

## 2024-05-21 DIAGNOSIS — G89.29 CHRONIC LEFT SHOULDER PAIN: ICD-10-CM

## 2024-05-21 DIAGNOSIS — M25.512 CHRONIC LEFT SHOULDER PAIN: ICD-10-CM

## 2024-08-15 ENCOUNTER — OFFICE VISIT (OUTPATIENT)
Dept: MEDICAL GROUP | Facility: LAB | Age: 74
End: 2024-08-15
Payer: MEDICARE

## 2024-08-15 VITALS
SYSTOLIC BLOOD PRESSURE: 122 MMHG | HEIGHT: 69 IN | HEART RATE: 60 BPM | OXYGEN SATURATION: 95 % | BODY MASS INDEX: 31.84 KG/M2 | RESPIRATION RATE: 18 BRPM | DIASTOLIC BLOOD PRESSURE: 80 MMHG | WEIGHT: 215 LBS | TEMPERATURE: 97.6 F

## 2024-08-15 DIAGNOSIS — E66.09 CLASS 1 OBESITY DUE TO EXCESS CALORIES WITH SERIOUS COMORBIDITY AND BODY MASS INDEX (BMI) OF 32.0 TO 32.9 IN ADULT: ICD-10-CM

## 2024-08-15 DIAGNOSIS — Z23 NEED FOR SHINGLES VACCINE: ICD-10-CM

## 2024-08-15 DIAGNOSIS — Z20.9 EXPOSURE TO POTENTIAL INFECTION: ICD-10-CM

## 2024-08-15 PROCEDURE — 3079F DIAST BP 80-89 MM HG: CPT | Performed by: INTERNAL MEDICINE

## 2024-08-15 PROCEDURE — 99213 OFFICE O/P EST LOW 20 MIN: CPT | Performed by: INTERNAL MEDICINE

## 2024-08-15 PROCEDURE — 3074F SYST BP LT 130 MM HG: CPT | Performed by: INTERNAL MEDICINE

## 2024-08-15 ASSESSMENT — FIBROSIS 4 INDEX: FIB4 SCORE: 1.658455965793542097

## 2024-08-15 NOTE — PROGRESS NOTES
CC: Elvis Landaverde is a 74 y.o. male is suffering from   Chief Complaint   Patient presents with    Follow-Up         SUBJECTIVE:  1. Need for shingles vaccine  Tarik is here for follow-up is in need of shingles vaccination is otherwise doing well    2. Exposure to potential infection  Patient and I have discussed that he was transfused twice in 2021, I have written out to check for hepatitis C though I told him would be low risk    3. Class 1 obesity due to excess calories with serious comorbidity and body mass index (BMI) of 32.0 to 32.9 in adult  History of obesity no change in medical therapy encourage patient work on diet exercise    History of Present Illness      Past social, family, history: Retired   Social History     Tobacco Use    Smoking status: Never    Smokeless tobacco: Never   Vaping Use    Vaping status: Never Used   Substance Use Topics    Alcohol use: Yes     Comment: 1 time per month    Drug use: Never         MEDICATIONS:    Current Outpatient Medications:     NON SPECIFIED, Please vaccinate with shingrix vaccine., Disp: 1 Each, Rfl: 1    Multiple Vitamin (MULTIVITAMIN ADULT PO), Take 1 Tablet by mouth every day., Disp: , Rfl:     Calcium Carb-Cholecalciferol (CALCIUM PLUS VITAMIN D3 PO), Take  by mouth. Once Daily, Disp: , Rfl:     Ascorbic Acid (VITAMIN C) Powder, Take  by mouth. Once Daily, Disp: , Rfl:     PROBLEMS:  Patient Active Problem List    Diagnosis Date Noted    Coronary artery calcification seen on CT scan 04/17/2024    Class 1 obesity due to excess calories with serious comorbidity and body mass index (BMI) of 32.0 to 32.9 in adult 06/16/2023    Aortic atherosclerosis (HCC) 06/16/2023    Aortic ectasia, abdominal (HCC) 06/16/2023    Aneurysm of internal iliac artery (HCC) 06/16/2023    Heartburn 06/16/2023    Raynaud's disease without gangrene 06/16/2023    Tinnitus 06/16/2023    History of GI bleed 06/16/2023       REVIEW OF SYSTEMS:  Gen.:  No Nausea, Vomiting,  "fever, Chills.  Heart: No chest pain.  Lungs:  No shortness of Breath.  Psychological: Wayne unusual Anxiety depression     PHYSICAL EXAM   Physical Exam       Constitutional: Alert, cooperative, not in acute distress.  Cardiovascular:  Rate Rhythm is regular without murmurs rubs clicks.     Thorax & Lungs: Clear to auscultation, no wheezing, rhonchi, or rales  HENT: Normocephalic, Atraumatic.  Eyes: PERRLA, EOMI, Conjunctiva normal.   Neck: Trachia is midline no swelling of the thyroid.   Lymphatic: No lymphadenopathy noted.   Neurologic: Alert & oriented x 3, cranial nerves II through XII are intact, Normal motor function, Normal sensory function, No focal deficits noted.   Psychiatric: Affect normal, Judgment normal, Mood normal.     VITAL SIGNS:/80   Pulse 60   Temp 36.4 °C (97.6 °F)   Resp 18   Ht 1.753 m (5' 9\")   Wt 97.5 kg (215 lb)   SpO2 95%   BMI 31.75 kg/m²     Labs: Reviewed    Assessment:                                                     Plan:  Assessment & Plan       1. Need for shingles vaccine  Prescription written for Shingrix vaccine  - NON SPECIFIED; Please vaccinate with shingrix vaccine.  Dispense: 1 Each; Refill: 1  - Comp Metabolic Panel; Future  - CBC WITH DIFFERENTIAL; Future    2. Exposure to potential infection  Check for hepatitis C  - HEP C VIRUS ANTIBODY; Future  - Comp Metabolic Panel; Future  - CBC WITH DIFFERENTIAL; Future    3. Class 1 obesity due to excess calories with serious comorbidity and body mass index (BMI) of 32.0 to 32.9 in adult  No change in medical therapy encourage patient to work on diet exercise  - Comp Metabolic Panel; Future  - CBC WITH DIFFERENTIAL; Future        "

## 2025-03-27 ENCOUNTER — TELEPHONE (OUTPATIENT)
Dept: HEALTH INFORMATION MANAGEMENT | Facility: OTHER | Age: 75
End: 2025-03-27
Payer: MEDICARE

## 2025-04-21 NOTE — Clinical Note
REFERRAL APPROVAL NOTICE         Sent on April 21, 2025                   Tarik Landaverde  [address]  [place] [address]                   Dear Mr. Landaverde,    After a careful review of the medical information and benefit coverage, [facility] has processed your referral. See below for additional details.    If applicable, you must be actively enrolled with your insurance for coverage of the authorized service. If you have any questions regarding your coverage, please contact your insurance directly.    REFERRAL INFORMATION   Referral #:  [id number]  Referred-To Provider    Referred-By Provider:  Podiatry    CANDELARIA Howell JEFFREY K      [address]  [place] [address]  [phone] [address] [facility] [address]  [phone]    Referral Start Date:  04/21/2025  Referral End Date:   04/17/2026             SCHEDULING  If you do not already have an appointment, please call [phone] to make an appointment.     MORE INFORMATION  If you do not already have a [employer] account, sign up at: [email or web address]  You can access your medical information, make appointments, see lab results, billing information, and more.  If you have questions regarding this referral, please contact  the [facility] Referrals department at:             [phone]. Monday - Friday 8:00AM - 5:00PM.     Sincerely,    [facility]

## 2025-07-08 SDOH — ECONOMIC STABILITY: FOOD INSECURITY: WITHIN THE PAST 12 MONTHS, THE FOOD YOU BOUGHT JUST DIDN'T LAST AND YOU DIDN'T HAVE MONEY TO GET MORE.: NEVER TRUE

## 2025-07-08 SDOH — ECONOMIC STABILITY: FOOD INSECURITY: WITHIN THE PAST 12 MONTHS, YOU WORRIED THAT YOUR FOOD WOULD RUN OUT BEFORE YOU GOT MONEY TO BUY MORE.: NEVER TRUE

## 2025-07-08 SDOH — HEALTH STABILITY: PHYSICAL HEALTH: ON AVERAGE, HOW MANY MINUTES DO YOU ENGAGE IN EXERCISE AT THIS LEVEL?: 90 MIN

## 2025-07-08 SDOH — HEALTH STABILITY: PHYSICAL HEALTH: ON AVERAGE, HOW MANY DAYS PER WEEK DO YOU ENGAGE IN MODERATE TO STRENUOUS EXERCISE (LIKE A BRISK WALK)?: 7 DAYS

## 2025-07-08 SDOH — ECONOMIC STABILITY: INCOME INSECURITY: IN THE LAST 12 MONTHS, WAS THERE A TIME WHEN YOU WERE NOT ABLE TO PAY THE MORTGAGE OR RENT ON TIME?: NO

## 2025-07-08 SDOH — ECONOMIC STABILITY: INCOME INSECURITY: HOW HARD IS IT FOR YOU TO PAY FOR THE VERY BASICS LIKE FOOD, HOUSING, MEDICAL CARE, AND HEATING?: NOT VERY HARD

## 2025-07-08 ASSESSMENT — ENCOUNTER SYMPTOMS: GENERAL WELL-BEING: GOOD

## 2025-07-08 ASSESSMENT — SOCIAL DETERMINANTS OF HEALTH (SDOH)
DO YOU BELONG TO ANY CLUBS OR ORGANIZATIONS SUCH AS CHURCH GROUPS UNIONS, FRATERNAL OR ATHLETIC GROUPS, OR SCHOOL GROUPS?: YES
IN A TYPICAL WEEK, HOW MANY TIMES DO YOU TALK ON THE PHONE WITH FAMILY, FRIENDS, OR NEIGHBORS?: ONCE A WEEK
HOW OFTEN DO YOU ATTEND CHURCH OR RELIGIOUS SERVICES?: 1 TO 4 TIMES PER YEAR
HOW OFTEN DO YOU ATTENT MEETINGS OF THE CLUB OR ORGANIZATION YOU BELONG TO?: MORE THAN 4 TIMES PER YEAR
HOW OFTEN DO YOU HAVE SIX OR MORE DRINKS ON ONE OCCASION: NEVER
HOW HARD IS IT FOR YOU TO PAY FOR THE VERY BASICS LIKE FOOD, HOUSING, MEDICAL CARE, AND HEATING?: NOT VERY HARD
HOW OFTEN DO YOU GET TOGETHER WITH FRIENDS OR RELATIVES?: ONCE A WEEK
HOW OFTEN DO YOU ATTENT MEETINGS OF THE CLUB OR ORGANIZATION YOU BELONG TO?: MORE THAN 4 TIMES PER YEAR
DO YOU BELONG TO ANY CLUBS OR ORGANIZATIONS SUCH AS CHURCH GROUPS UNIONS, FRATERNAL OR ATHLETIC GROUPS, OR SCHOOL GROUPS?: YES
WITHIN THE PAST 12 MONTHS, YOU WORRIED THAT YOUR FOOD WOULD RUN OUT BEFORE YOU GOT THE MONEY TO BUY MORE: NEVER TRUE
IN THE PAST 12 MONTHS, HAS THE ELECTRIC, GAS, OIL, OR WATER COMPANY THREATENED TO SHUT OFF SERVICE IN YOUR HOME?: NO
HOW OFTEN DO YOU ATTEND CHURCH OR RELIGIOUS SERVICES?: 1 TO 4 TIMES PER YEAR
HOW MANY DRINKS CONTAINING ALCOHOL DO YOU HAVE ON A TYPICAL DAY WHEN YOU ARE DRINKING: 1 OR 2
HOW OFTEN DO YOU HAVE A DRINK CONTAINING ALCOHOL: MONTHLY OR LESS
IN A TYPICAL WEEK, HOW MANY TIMES DO YOU TALK ON THE PHONE WITH FAMILY, FRIENDS, OR NEIGHBORS?: ONCE A WEEK
HOW OFTEN DO YOU GET TOGETHER WITH FRIENDS OR RELATIVES?: ONCE A WEEK

## 2025-07-08 ASSESSMENT — LIFESTYLE VARIABLES
AUDIT-C TOTAL SCORE: 1
HOW OFTEN DO YOU HAVE SIX OR MORE DRINKS ON ONE OCCASION: NEVER
HOW OFTEN DO YOU HAVE A DRINK CONTAINING ALCOHOL: MONTHLY OR LESS
HOW MANY STANDARD DRINKS CONTAINING ALCOHOL DO YOU HAVE ON A TYPICAL DAY: 1 OR 2
SKIP TO QUESTIONS 9-10: 1

## 2025-07-08 ASSESSMENT — PATIENT HEALTH QUESTIONNAIRE - PHQ9
CLINICAL INTERPRETATION OF PHQ2 SCORE: 0
1. LITTLE INTEREST OR PLEASURE IN DOING THINGS: NOT AT ALL
2. FEELING DOWN, DEPRESSED, IRRITABLE, OR HOPELESS: NOT AT ALL

## 2025-07-08 ASSESSMENT — ACTIVITIES OF DAILY LIVING (ADL): BATHING_REQUIRES_ASSISTANCE: 0

## 2025-07-08 NOTE — ASSESSMENT & PLAN NOTE
"Chronic, ongoing. BMI today 35.46. Associated with elevated CT calcium score/CAD. He states he has gained 30lbs in the last year but is working on losing this weight by \"crash dieting.\" He exercises daily by walking 3-5 miles/day and tolerates this well. Encourage healthy calorie conscious diet with regular physical activity. Follow up with PCP at least annually for continued monitoring and management.   "

## 2025-07-09 ENCOUNTER — APPOINTMENT (OUTPATIENT)
Dept: MEDICAL GROUP | Facility: LAB | Age: 75
End: 2025-07-09
Payer: MEDICARE

## 2025-07-09 ENCOUNTER — OFFICE VISIT (OUTPATIENT)
Dept: FAMILY PLANNING/WOMEN'S HEALTH CLINIC | Facility: PHYSICIAN GROUP | Age: 75
End: 2025-07-09
Payer: MEDICARE

## 2025-07-09 VITALS
SYSTOLIC BLOOD PRESSURE: 118 MMHG | DIASTOLIC BLOOD PRESSURE: 80 MMHG | HEIGHT: 69 IN | HEART RATE: 82 BPM | BODY MASS INDEX: 35.56 KG/M2 | WEIGHT: 240.1 LBS | OXYGEN SATURATION: 96 %

## 2025-07-09 VITALS
WEIGHT: 237.6 LBS | TEMPERATURE: 97.5 F | DIASTOLIC BLOOD PRESSURE: 92 MMHG | HEIGHT: 69 IN | OXYGEN SATURATION: 95 % | SYSTOLIC BLOOD PRESSURE: 162 MMHG | BODY MASS INDEX: 35.19 KG/M2 | HEART RATE: 80 BPM | RESPIRATION RATE: 14 BRPM

## 2025-07-09 DIAGNOSIS — E66.01 CLASS 2 SEVERE OBESITY DUE TO EXCESS CALORIES WITH SERIOUS COMORBIDITY AND BODY MASS INDEX (BMI) OF 35.0 TO 35.9 IN ADULT (HCC): Primary | ICD-10-CM

## 2025-07-09 DIAGNOSIS — M79.672 PAIN IN BOTH FEET: ICD-10-CM

## 2025-07-09 DIAGNOSIS — H53.9 VISION CHANGES: Primary | ICD-10-CM

## 2025-07-09 DIAGNOSIS — E66.812 CLASS 2 SEVERE OBESITY DUE TO EXCESS CALORIES WITH SERIOUS COMORBIDITY AND BODY MASS INDEX (BMI) OF 35.0 TO 35.9 IN ADULT (HCC): Primary | ICD-10-CM

## 2025-07-09 DIAGNOSIS — I15.9 SECONDARY HYPERTENSION: ICD-10-CM

## 2025-07-09 DIAGNOSIS — M79.671 PAIN IN BOTH FEET: ICD-10-CM

## 2025-07-09 DIAGNOSIS — I25.10 CORONARY ARTERY CALCIFICATION SEEN ON CT SCAN: ICD-10-CM

## 2025-07-09 DIAGNOSIS — E11.8 ACUTE DM TYPE 2 CAUSING COMPLICATION (HCC): ICD-10-CM

## 2025-07-09 DIAGNOSIS — Z23 NEED FOR PNEUMOCOCCAL 20-VALENT CONJUGATE VACCINATION: ICD-10-CM

## 2025-07-09 DIAGNOSIS — E66.01 SEVERE OBESITY (HCC): ICD-10-CM

## 2025-07-09 PROCEDURE — 3080F DIAST BP >= 90 MM HG: CPT | Performed by: INTERNAL MEDICINE

## 2025-07-09 PROCEDURE — 3077F SYST BP >= 140 MM HG: CPT | Performed by: INTERNAL MEDICINE

## 2025-07-09 PROCEDURE — 3074F SYST BP LT 130 MM HG: CPT | Performed by: PHYSICIAN ASSISTANT

## 2025-07-09 PROCEDURE — 3079F DIAST BP 80-89 MM HG: CPT | Performed by: PHYSICIAN ASSISTANT

## 2025-07-09 PROCEDURE — G0439 PPPS, SUBSEQ VISIT: HCPCS | Mod: 25 | Performed by: INTERNAL MEDICINE

## 2025-07-09 PROCEDURE — G0009 ADMIN PNEUMOCOCCAL VACCINE: HCPCS | Performed by: INTERNAL MEDICINE

## 2025-07-09 PROCEDURE — G0439 PPPS, SUBSEQ VISIT: HCPCS | Performed by: PHYSICIAN ASSISTANT

## 2025-07-09 PROCEDURE — 90677 PCV20 VACCINE IM: CPT | Performed by: INTERNAL MEDICINE

## 2025-07-09 RX ORDER — IRBESARTAN 150 MG/1
150 TABLET ORAL NIGHTLY
Qty: 90 TABLET | Refills: 3 | Status: SHIPPED | OUTPATIENT
Start: 2025-07-09

## 2025-07-09 SDOH — ECONOMIC STABILITY: HOUSING INSECURITY: AT ANY TIME IN THE PAST 12 MONTHS, WERE YOU HOMELESS OR LIVING IN A SHELTER (INCLUDING NOW)?: NO

## 2025-07-09 SDOH — ECONOMIC STABILITY: FOOD INSECURITY: WITHIN THE PAST 12 MONTHS, YOU WORRIED THAT YOUR FOOD WOULD RUN OUT BEFORE YOU GOT THE MONEY TO BUY MORE.: NEVER TRUE

## 2025-07-09 SDOH — ECONOMIC STABILITY: FOOD INSECURITY: HOW HARD IS IT FOR YOU TO PAY FOR THE VERY BASICS LIKE FOOD, HOUSING, MEDICAL CARE, AND HEATING?: NOT HARD AT ALL

## 2025-07-09 SDOH — ECONOMIC STABILITY: FOOD INSECURITY: WITHIN THE PAST 12 MONTHS, THE FOOD YOU BOUGHT JUST DIDN'T LAST AND YOU DIDN'T HAVE MONEY TO GET MORE.: NEVER TRUE

## 2025-07-09 SDOH — ECONOMIC STABILITY: TRANSPORTATION INSECURITY: IN THE PAST 12 MONTHS, HAS LACK OF TRANSPORTATION KEPT YOU FROM MEDICAL APPOINTMENTS OR FROM GETTING MEDICATIONS?: NO

## 2025-07-09 SDOH — ECONOMIC STABILITY: HOUSING INSECURITY: IN THE LAST 12 MONTHS, WAS THERE A TIME WHEN YOU WERE NOT ABLE TO PAY THE MORTGAGE OR RENT ON TIME?: NO

## 2025-07-09 ASSESSMENT — ENCOUNTER SYMPTOMS: GENERAL WELL-BEING: GOOD

## 2025-07-09 ASSESSMENT — PATIENT HEALTH QUESTIONNAIRE - PHQ9: CLINICAL INTERPRETATION OF PHQ2 SCORE: 0

## 2025-07-09 ASSESSMENT — ACTIVITIES OF DAILY LIVING (ADL)
BATHING_REQUIRES_ASSISTANCE: 0
LACK_OF_TRANSPORTATION: NO

## 2025-07-09 ASSESSMENT — FIBROSIS 4 INDEX
FIB4 SCORE: 1.68
FIB4 SCORE: 1.68

## 2025-07-09 NOTE — ASSESSMENT & PLAN NOTE
Chronic issue diagnosed via findings on 8/2023 CT calcium score with total at 3429. He attempted rosuvastatin and reports it caused memory issues. He reports his lipid panels have otherwise been normal. He denies CP, dyspnea, decreasing exercise tolerance. Follow up with PCP for continued monitoring and management.     Coronary calcification:  LMA - 0.0  LCX - 59.8  LAD - 2286.4  RCA - 1082.9   Total Calcium Score: 3429.2

## 2025-07-09 NOTE — Clinical Note
REFERRAL APPROVAL NOTICE         Sent on July 9, 2025                   Tarik Landaverde  [address]  [place] [address]                   Dear Mr. Landaverde,    After a careful review of the medical information and benefit coverage, [facility] has processed your referral. See below for additional details.    If applicable, you must be actively enrolled with your insurance for coverage of the authorized service. If you have any questions regarding your coverage, please contact your insurance directly.    REFERRAL INFORMATION   Referral #:  [id number]  Referred-To Provider    Referred-By Provider:  Podiatry    CANDELARIA Howell JEFFREY K      [address]  [place] [address]  [phone] [address] [facility] [address]  [phone]    Referral Start Date:  07/09/2025  Referral End Date:   07/09/2026             SCHEDULING  If you do not already have an appointment, please call [phone] to make an appointment.     MORE INFORMATION  If you do not already have a [employer] account, sign up at: [email or web address]  You can access your medical information, make appointments, see lab results, billing information, and more.  If you have questions regarding this referral, please contact  the [facility] Referrals department at:             [phone]. Monday - Friday 8:00AM - 5:00PM.     Sincerely,    [facility]

## 2025-07-09 NOTE — PROGRESS NOTES
Comprehensive Health Assessment Program     Elvis Landaverde is a 75 y.o. here for his comprehensive health assessment.    Patient Active Problem List    Diagnosis Date Noted    Body mass index (BMI) of 35.0-35.9 in adult 07/09/2025    Secondary hypertension 07/09/2025    Coronary artery calcification seen on CT scan 04/17/2024    Class 2 severe obesity due to excess calories with serious comorbidity and body mass index (BMI) of 35.0 to 35.9 in adult (HCC) 06/16/2023    Aortic atherosclerosis (HCC) 06/16/2023    Aortic ectasia, abdominal (HCC) 06/16/2023    Aneurysm of internal iliac artery (HCC) 06/16/2023    Heartburn 06/16/2023    Raynaud's disease without gangrene 06/16/2023    Tinnitus 06/16/2023    History of GI bleed 06/16/2023       Current Medications[1]       Current supplements as per medication list.     Allergies:   Covid-19 (mrna) vaccine  Social History[2]  History reviewed. No pertinent family history.  Elvis  has no past medical history on file.   Past Surgical History[3]    Screening:  In the last six months have you experienced any leakage of urine? No    Depression Screening  Little interest or pleasure in doing things?  0 - not at all  Feeling down, depressed , or hopeless? 0 - not at all  Patient Health Questionnaire Score: 0     If depressive symptoms identified deferred to follow up visit unless specifically addressed in assessment and plan.    Interpretation of PHQ-9 Total Score   Score Severity   1-4 No Depression   5-9 Mild Depression   10-14 Moderate Depression   15-19 Moderately Severe Depression   20-27 Severe Depression    Screening for Cognitive Impairment  Do you or any of your friends or family members have any concern about your memory? No  Three Minute Recall (Village, Kitchen, Baby) 3/3    Juanito clock face with all 12 numbers and set the hands to show 10 minutes past 11.  Yes 5/5  Cognitive concerns identified deferred for follow up unless specifically addressed in assessment    Vaccine Information Statement(s) was given today. This has been reviewed, questions answered, and verbal consent given by Parent for injection(s) and administration of Influenza (Inactivated).    1. Does the patient have a moderate to severe fever?  No  2. Has the patient had a serious reaction to a flu shot before?   No  3. Has the patient ever had Guillian Moscow Syndrome within 6 weeks of a previous flu shot?  No  4. Is the patient less that 6 months of age?  No    Patient is eligible to receive the vaccine based on all questions being answered as 'No'.    Patient tolerated without incident. See immunization grid for documentation.         and plan.    Fall Risk Assessment  Has the patient had two or more falls in the last year or any fall with injury in the last year?  No    Safety Assessment  Do you always wear your seatbelt?  Yes  Any changes to home needed to function safely? No  Difficulty hearing.  No  Patient counseled about all safety risks that were identified.    Functional Assessment ADLs  Are there any barriers preventing you from cooking for yourself or meeting nutritional needs?  No.    Are there any barriers preventing you from driving safely or obtaining transportation?  No.    Are there any barriers preventing you from using a telephone or calling for help?  No    Are there any barriers preventing you from shopping?  No.    Are there any barriers preventing you from taking care of your own finances?  No    Are there any barriers preventing you from managing your medications?  No    Are there any barriers preventing you from showering, bathing or dressing yourself? No    Are there any barriers preventing you from doing housework or laundry? No  Are there any barriers preventing you from using the toilet?No  Are you currently engaging in any exercise or physical activity?  Yes. Walks 3-5 miles daily     Self-Assessment of Health  What is your perception of your health? Good    Do you sleep more than six hours a night? Yes    In the past 7 days, how much did pain keep you from doing your normal work? None    Do you spend quality time with family or friends (virtually or in person)? Yes    Do you usually eat a heart healthy diet that constists of a variety of fruits, vegetables, whole grains and fiber? Yes    Do you eat foods high in fat and/or Fast Food more than three times per week? No    How concerned are you that your medical conditions are not being well managed? Not at all    Are you worried that in the next 2 months, you may not have stable housing that you own, rent, or stay in as part of a household? No      Advance Care  Planning  Do you have an Advance Directive, Living Will, Durable Power of , or POLST? No  Provided patient with educational brochure regarding Advance Care Planning.                    Health Maintenance Summary            Current Care Gaps       Zoster (Shingles) Vaccines (1 of 2) Never done     No completion history exists for this topic.              COVID-19 Vaccine (1 - 2024-25 season) Never done     No completion history exists for this topic.                      Upcoming       Influenza Vaccine (1) Next due on 9/1/2025     No completion history exists for this topic.              Annual Wellness Visit (Yearly) Next due on 7/9/2026 07/09/2025  Level of Service: MT ANNUAL WELLNESS VISIT-INCLUDES PPPS SUBSEQUE*    07/09/2025  Level of Service: MT INITIAL ANNUAL WELLNESS VISIT-INCLUDES PPPS    04/17/2024  Level of Service: MT ANNUAL WELLNESS VISIT-INCLUDES PPPS SUBSEQUE*    06/16/2023  Level of Service: MT ANNUAL WELLNESS VISIT-INCLUDES PPPS SUBSEQUE*              IMM DTaP/Tdap/Td Vaccine (2 - Td or Tdap) Next due on 12/21/2033 12/21/2023  Imm Admin: Tdap Vaccine                      Completed or No Longer Recommended       Hepatitis C Screening  Completed      07/09/2025  Done              Pneumococcal Vaccine: 50+ Years (Series Information) Completed      07/09/2025  Imm Admin: Pneumococcal Conjugate Vaccine (PCV20)              Hepatitis A Vaccine (Hep A) (Series Information) Aged Out     No completion history exists for this topic.              Hepatitis B Vaccine (Hep B) (Series Information) Aged Out     No completion history exists for this topic.              HPV Vaccines (Series Information) Aged Out     No completion history exists for this topic.              Polio Vaccine (Inactivated Polio) (Series Information) Aged Out     No completion history exists for this topic.              Meningococcal Immunization (Series Information) Aged Out     No completion history exists for this  "topic.              Meningococcal B Vaccine (Series Information) Aged Out     No completion history exists for this topic.              Colorectal Cancer Screening  Discontinued        Frequency changed to Never automatically (Topic No Longer Applies)    02/05/2023  Colon Cancer Screening Annual FIT ([facility] in 02/2023)                            Patient Care Team:  Osvaldo Barbosa D.O. as PCP - General (Internal Medicine)  ERON Rosado (Dermatology)  Shakira Landon as Patient Advocate (Pharmacy)      Financial Resource Strain: Low Risk  (7/9/2025)    Overall Financial Resource Strain (CARDIA)     Difficulty of Paying Living Expenses: Not hard at all      Transportation Needs: No Transportation Needs (7/9/2025)    PRAPARE - Transportation     Lack of Transportation (Medical): No     Lack of Transportation (Non-Medical): No      Food Insecurity: No Food Insecurity (7/9/2025)    Hunger Vital Sign     Worried About Running Out of Food in the Last Year: Never true     Ran Out of Food in the Last Year: Never true        Encounter Vitals  Blood Pressure : 118/80  Pulse: 82  Pulse Oximetry: 96 %  Weight: 109 kg (240 lb 1.6 oz)  Height: 175.3 cm (5' 9\")  BMI (Calculated): 35.46     Physical Exam:  Constitutional: NAD  HENMT: NC/AT, EOMI  Cardiovascular: RRR, No m/r/g  Lungs: CTAB, no w/r/r  Extremities: No c/c/e  Skin: No lesions notes  Neurologic: Alert & oriented x3, CN II-XII grossly intact    Assessment and Plan. The following treatment and monitoring plan is recommended:  Class 2 severe obesity due to excess calories with serious comorbidity and body mass index (BMI) of 35.0 to 35.9 in adult (HCC)  Chronic, ongoing. BMI today 35.46. Associated with elevated CT calcium score/CAD. He states he has gained 30lbs in the last year but is working on losing this weight by \"crash dieting.\" He exercises daily by walking 3-5 miles/day and tolerates this well. Encourage " healthy calorie conscious diet with regular physical activity. Follow up with PCP at least annually for continued monitoring and management.     Coronary artery calcification seen on CT scan  Chronic issue diagnosed via findings on 8/2023 CT calcium score with total at 3429. He attempted rosuvastatin and reports it caused memory issues. He reports his lipid panels have otherwise been normal. He denies CP, dyspnea, decreasing exercise tolerance. Follow up with PCP for continued monitoring and management.     Coronary calcification:  LMA - 0.0  LCX - 59.8  LAD - 2286.4  RCA - 1082.9   Total Calcium Score: 3429.2    Secondary hypertension  Chronic, ongoing. BP today 118/80. Pt monitors BP at home stating he typically runs 130 SBP. His primary care provider advised he resume irbesartan 150mg daily with a new rx provided today at earlier visit. Pt states he will monitor his BP. Follow up with PCP    Services suggested: No services needed at this time  Health Care Screening: Age-appropriate preventive services recommended by USPTF and ACIP covered by Medicare were discussed today. Services ordered if indicated and agreed upon by the patient.  Referrals offered: Community-based lifestyle interventions to reduce health risks and promote self-management and wellness, fall prevention, nutrition, physical activity, tobacco-use cessation, weight loss, and mental health services as per orders if indicated.    Discussion today about general wellness and lifestyle habits:    Prevent falls and reduce trip hazards; Cautioned about securing or removing rugs.  Have a working fire alarm and carbon monoxide detector.  Engage in regular physical activity and social activities.    Follow-up: No follow-ups on file.              [1]   Current Outpatient Medications   Medication Sig Dispense Refill    irbesartan (AVAPRO) 150 MG Tab Take 1 Tablet by mouth every evening. (Patient not taking: Reported on 7/9/2025) 90 Tablet 3    NON SPECIFIED  Please vaccinate with shingrix vaccine. 1 Each 1    Multiple Vitamin (MULTIVITAMIN ADULT PO) Take 1 Tablet by mouth every day.      Calcium Carb-Cholecalciferol (CALCIUM PLUS VITAMIN D3 PO) Take  by mouth. Once Daily      Ascorbic Acid (VITAMIN C) Powder Take  by mouth. Once Daily       No current facility-administered medications for this visit.   [2]   Social History  Tobacco Use    Smoking status: Never    Smokeless tobacco: Never   Vaping Use    Vaping status: Never Used   Substance Use Topics    Alcohol use: Yes     Comment: 1 time per month    Drug use: Never   [3] History reviewed. No pertinent surgical history.

## 2025-07-09 NOTE — ASSESSMENT & PLAN NOTE
Chronic, ongoing. BP today 118/80. Pt monitors BP at home stating he typically runs 130 SBP. His primary care provider advised he resume irbesartan 150mg daily with a new rx provided today at earlier visit. Pt states he will monitor his BP. Follow up with PCP

## 2025-07-09 NOTE — Clinical Note
REFERRAL APPROVAL NOTICE         Sent on July 9, 2025                   Tarik Landaverde  [address]  [place] [address]                   Dear Mr. Landaverde,    After a careful review of the medical information and benefit coverage, [facility] has processed your referral. See below for additional details.    If applicable, you must be actively enrolled with your insurance for coverage of the authorized service. If you have any questions regarding your coverage, please contact your insurance directly.    REFERRAL INFORMATION   Referral #:  [id number]  Referred-To Provider    Referred-By Provider:  Ophthalmology    CANDELARIA Howell KEVIN M      [address]  [place] [address]  [phone] [address] [place] [id number] [phone]    Referral Start Date:  07/09/2025  Referral End Date:   07/09/2026             SCHEDULING  If you do not already have an appointment, please call [phone] to make an appointment.     MORE INFORMATION  If you do not already have a [employer] account, sign up at: [email or web address]  You can access your medical information, make appointments, see lab results, billing information, and more.  If you have questions regarding this referral, please contact  the [facility] Referrals department at:             [phone]. Monday - Friday 8:00AM - 5:00PM.     Sincerely,    [facility]

## 2025-07-09 NOTE — PROGRESS NOTES
Chief Complaint   Patient presents with    Annual Exam       HPI:  Elvis Landaverde is a 75 y.o. here for Medicare Annual Wellness Visit     Patient Active Problem List    Diagnosis Date Noted    Coronary artery calcification seen on CT scan 04/17/2024    Class 1 obesity due to excess calories with serious comorbidity and body mass index (BMI) of 32.0 to 32.9 in adult 06/16/2023    Aortic atherosclerosis (HCC) 06/16/2023    Aortic ectasia, abdominal (HCC) 06/16/2023    Aneurysm of internal iliac artery (HCC) 06/16/2023    Heartburn 06/16/2023    Raynaud's disease without gangrene 06/16/2023    Tinnitus 06/16/2023    History of GI bleed 06/16/2023       Current Medications[1]       Current supplements as per medication list.     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 He has never used smokeless tobacco. He reports current alcohol use. He reports that he does not use drugs.  Counseling given: Not Answered      ROS:    Gait: Uses no assistive device  Ostomy: No  Other tubes: No  Amputations: No  Chronic oxygen use: No  Last eye exam: Unknown  Wears hearing aids: No   : Denies any urinary leakage during the last 6 months    Screening:     Depression Screening  Little interest or pleasure in doing things?  0 - not at all  Feeling down, depressed , or hopeless? 0 - not at all  Patient Health Questionnaire Score: 0     If depressive symptoms identified deferred to follow up visit unless specifically addressed in assessment and plan.    Interpretation of PHQ-9 Total Score   Score Severity   1-4 No Depression   5-9 Mild Depression   10-14 Moderate Depression   15-19 Moderately Severe Depression   20-27 Severe Depression    Screening for Cognitive Impairment  Do you or any of your friends or family members have any concern about your memory? No  Three Minute Recall (Village, Kitchen, Baby) 3/3    Juanito clock face  with all 12 numbers and set the hands to show 10 minutes past 11.  Yes    Cognitive concerns identified deferred for follow up unless specifically addressed in assessment and plan.    Fall Risk Assessment  Has the patient had two or more falls in the last year or any fall with injury in the last year?  No    Safety Assessment  Do you always wear your seatbelt?  Yes  Any changes to home needed to function safely? No  Difficulty hearing.  Yes  Patient counseled about all safety risks that were identified.    Functional Assessment ADLs  Are there any barriers preventing you from cooking for yourself or meeting nutritional needs?  No.    Are there any barriers preventing you from driving safely or obtaining transportation?  No.    Are there any barriers preventing you from using a telephone or calling for help?  No    Are there any barriers preventing you from shopping?  No.    Are there any barriers preventing you from taking care of your own finances?  No    Are there any barriers preventing you from managing your medications?  No    Are there any barriers preventing you from showering, bathing or dressing yourself? No    Are there any barriers preventing you from doing housework or laundry? No  Are there any barriers preventing you from using the toilet?No  Are you currently engaging in any exercise or physical activity?  Yes.      Self-Assessment of Health  What is your perception of your health? Good    Do you sleep more than six hours a night? Yes    In the past 7 days, how much did pain keep you from doing your normal work? None    Do you spend quality time with family or friends (virtually or in person)? Yes    Do you usually eat a heart healthy diet that constists of a variety of fruits, vegetables, whole grains and fiber? Yes    Do you eat foods high in fat and/or Fast Food more than three times per week? No    How concerned are you that your medical conditions are not being well managed? Not at all    Are you  worried that in the next 2 months, you may not have stable housing that you own, rent, or stay in as part of a household? No      Advance Care Planning  Do you have an Advance Directive, Living Will, Durable Power of , or POLST? No                 Health Maintenance Summary            Current Care Gaps       Annual Wellness Visit (Yearly) Overdue since 4/17/2025 04/17/2024  Level of Service: PA ANNUAL WELLNESS VISIT-INCLUDES PPPS SUBSEQUE*    06/16/2023  Level of Service: PA ANNUAL WELLNESS VISIT-INCLUDES PPPS SUBSEQUE*              Hepatitis C Screening (Once) Never done      08/15/2024  Order placed for HEP C VIRUS ANTIBODY by Osvaldo Barbosa D.O.              Zoster (Shingles) Vaccines (1 of 2) Never done     No completion history exists for this topic.              Pneumococcal Vaccine: 50+ Years (1 of 1 - PCV) Never done     No completion history exists for this topic.              COVID-19 Vaccine (1 - 2024-25 season) Never done     No completion history exists for this topic.                      Upcoming       Influenza Vaccine (1) Next due on 9/1/2025     No completion history exists for this topic.              IMM DTaP/Tdap/Td Vaccine (2 - Td or Tdap) Next due on 12/21/2033 12/21/2023  Imm Admin: Tdap Vaccine                      Completed or No Longer Recommended       Hepatitis A Vaccine (Hep A) (Series Information) Aged Out     No completion history exists for this topic.              Hepatitis B Vaccine (Hep B) (Series Information) Aged Out     No completion history exists for this topic.              HPV Vaccines (Series Information) Aged Out     No completion history exists for this topic.              Polio Vaccine (Inactivated Polio) (Series Information) Aged Out     No completion history exists for this topic.              Meningococcal Immunization (Series Information) Aged Out     No completion history exists for this topic.              Meningococcal B Vaccine (Series  "Information) Aged Out     No completion history exists for this topic.              Colorectal Cancer Screening  Discontinued        Frequency changed to Never automatically (Topic No Longer Applies)    02/05/2023  Colon Cancer Screening Annual FIT (Done - [facility] in [facility] in 02/2023)                            Patient Care Team:  Osvaldo Barbosa D.O. as PCP - General (Internal Medicine)  ERON Rosado (Dermatology)  Shakira Landon as Patient Advocate (Pharmacy)        Social History[2]  No family history on file.  He  has no past medical history on file.   Past Surgical History[3]    Exam:   Pulse 80   Resp 14   Ht 1.753 m (5' 9\")   Wt 108 kg (237 lb 9.6 oz)   SpO2 95%  Body mass index is 35.09 kg/m².    Hearing fair.    Dentition did not ask  Alert, oriented in no acute distress.  Eye contact is good, speech goal directed, affect calm    Assessment and Plan. The following treatment and monitoring plan is recommended:     1. Vision changes (Primary)  Patient and I have discussed that he continues to have problems with right eye vision changes referrals been written to ophthalmology  - Referral to Ophthalmology  - POCT  A1C    2. Secondary hypertension  Patient with hypertension has previously stopped his hypertensive medication encouraged him to restart  - irbesartan (AVAPRO) 150 MG Tab; Take 1 Tablet by mouth every evening.  Dispense: 90 Tablet; Refill: 3  - Referral to Ophthalmology  - POCT  A1C    3. Need for pneumococcal 20-valent conjugate vaccination  Vaccination given  - Pneumococcal Conjugate Vaccine 20-Valent (6 wks+)  - POCT  A1C    4. Pain in both feet  Referral written to podiatry  - Referral to Podiatry  - POCT  A1C    5. Acute DM type 2 causing complication (HCC)  Clinically stable  - POCT  A1C    6. Severe obesity (HCC)  Encourage patient work on diet exercise    7. Body mass index (BMI) of 35.0-35.9 in adult  As detailed above    8. Coronary artery " calcification seen on CT scan  CT cardiac score is elevated at 3429.2 August 30, 2023      Services suggested: No services needed at this time  Health Care Screening: Age-appropriate preventive services recommended by USPTF and ACIP covered by Medicare were discussed today. Services ordered if indicated and agreed upon by the patient.  Referrals offered: Community-based lifestyle interventions to reduce health risks and promote self-management and wellness, fall prevention, nutrition, physical activity, tobacco-use cessation, weight loss, and mental health services as per orders if indicated.    Discussion today about general wellness and lifestyle habits:    Prevent falls and reduce trip hazards; Cautioned about securing or removing rugs.  Have a working fire alarm and carbon monoxide detector;   Engage in regular physical activity and social activities     Follow-up: No follow-ups on file.         [1]   Current Outpatient Medications   Medication Sig Dispense Refill    NON SPECIFIED Please vaccinate with shingrix vaccine. 1 Each 1    Multiple Vitamin (MULTIVITAMIN ADULT PO) Take 1 Tablet by mouth every day.      Calcium Carb-Cholecalciferol (CALCIUM PLUS VITAMIN D3 PO) Take  by mouth. Once Daily      Ascorbic Acid (VITAMIN C) Powder Take  by mouth. Once Daily       No current facility-administered medications for this visit.   [2]   Social History  Tobacco Use    Smoking status: Never    Smokeless tobacco: Never   Vaping Use    Vaping status: Never Used   Substance Use Topics    Alcohol use: Yes     Comment: 1 time per month    Drug use: Never   [3] No past surgical history on file.

## 2025-07-15 NOTE — Clinical Note
REFERRAL APPROVAL NOTICE         Sent on July 15, 2025                   Tarik Landaverde  [address]  [facility] [address]                   Dear Mr. Landaverde,    After a careful review of the medical information and benefit coverage, [facility] has processed your referral. See below for additional details.    If applicable, you must be actively enrolled with your insurance for coverage of the authorized service. If you have any questions regarding your coverage, please contact your insurance directly.    REFERRAL INFORMATION   Referral #:  [id number]  Referred-To Provider    Referred-By Provider:  Ophthalmology    CANDELARIA Howell, [facility]      [address] [facility] [address] [place] [id number] [phone] [address] [facility] [address]  [phone]    Referral Start Date:  07/09/2025  Referral End Date:   07/09/2026             SCHEDULING  If you do not already have an appointment, please call [phone] to make an appointment.     MORE INFORMATION  If you do not already have a [email or web address] account, sign up at: [email or web address]  You can access your medical information, make appointments, see lab results, billing information, and more.  If you have questions regarding this referral, please contact  the [facility] Referrals department at:             [phone]. Monday - Friday 8:00AM - 5:00PM.     Sincerely,    [facility]

## 2025-07-20 DIAGNOSIS — R77.1 ELEVATED SERUM GLOBULIN LEVEL: Primary | ICD-10-CM

## 2025-07-31 NOTE — Clinical Note
Tarik Landaverde   [address]  [facility] [address]    July 31, 2025    Member Name: Elvis Landaverde   Member Number: [id number]   Reference Number: [id number]   Approved Services: Outpatient Surgery   Approved Service Dates: 07/31/2025 - 12/31/2025   Requesting Provider: [facility]   Requested Provider: Christian Resendiz     Dear Elvis Landaverde:    The following medical service(s) requested by [facility] have been approved:    Procedure Code Procedure Code Name Requested Quantity Approved Quantity Status   09737 (CPT®) [place] CATARACT SURG W/IOL 1 STAGE WO ENDO 2 2 Authorized       Approved Quantity means the number of visits approved for medication treatments and/or medical services.    The services should be provided by Christian Resendiz no later than 12/31/2025. Please contact the provider listed below with any questions. Your plan benefit may require a deductible, co-payment, or co-insurance for these services.    Provider Information:  Christian Resendiz  [phone]    Important Note for Members:    This authorization does not guarantee that [employer] or [employer] will pay for your care. Payment depends on your plan details, if you're eligible for coverage on the day you receive care, and whether the service follows plan rules. These include things such as reviewing if the service is medically necessary. We recommend reviewing your Evidence of Coverage before receiving any care.    Important Note for Providers:    Payment by [facility] or [facility] for these services is subject to the terms of the Evidence of Coverage or Summary Plan Description, eligibility at the time of service, standard processing procedures and confirmation of benefit coverage. All claims are subject to standard processing procedures, including but not limited to coding edits, medical necessity determinations, and other plan policies in effect at the time of claim adjudication. Providers are required to  follow all [facility] Administrative Guidelines and requirements.    For any questions or additional information, please contact Customer Service:    [facility] Toll Free: [phone]  TTY users dial: [phone]   Call Center Hours:  Oct 1 - Mar 31, Mon - Fri 7 AM to 8 PM PST  Oct 1 - Mar 31, Sat - Sun 8 AM to 8 PM PST  Apr 1 - Sep 30, Mon - Fri 7 AM to 8 PM PST   Office Hours: Mon - Fri 8 AM to 5 PM [facility]   E-mail: [email or web address]   Website:  [email or web address]      This information is available for free in other languages. Please contact Customer Service at the phone number above for more information. [facility] [employer] complies with applicable Federal civil rights laws and does not discriminate on the basis of race, color, national origin, age, disability or sex.    Sincerely,     Healthcare Utilization Management Department     Cc: Christian Resendiz   [facility]    Multi-Language Insert  Multi- Services  English: We have free  services to answer any questions you may have about our health or drug plan.  To get an , just call us at [phone].  Someone who speaks English/Language can help you.  This is a free service.  Romansh: Tenemos servicios de intérprete sin costo alguno  para responder cualquier pregunta que pueda tener sobre nuestro plan de yamilet o medicamentos. Para hablar con un intérprete, por favor llame al [phone]. Alguien que hable español le podrá ayudar. Denise es un servicio gratuito.  Chinese Mandarin: ?????????????????????????????? ???????????????? [phone]????????????????? ?????????  Chinese Cantonese: ?????????????????????????????? ????????????? [phone]???????????????????? ????????  Tagalog:  Shiraz grove.  virgil Swan  andra  [phone]. Steffanie reyes suzan hank giffordalex mares Tagalog.  Gunnar benson.  Lao:  Nous proposons carlos services gratuits d'interprétation pour répondre à toutes grayson questions relatives à notre régime de santé ou d'assurance-médicaments. Pour accéder au service d'interprétation, il vous suffit de nous appeler au [phone]. Un interlocuteur parSan Diego County Psychiatric Hospitals pourra vous aider. Ce service est gratuit.  Hungarian:  Turner tôi có d?ch v? thông d?ch mi?n phí ð? tr? l?i các câu h?i v? chýõng s?c kh?e và chýõng trình thu?c men. N?u quí v? c?n thông d?ch viên sera g?i [phone] s? có nhân viên nói ti?ng Vi?t giúp ð? quí v?. Ðây là d?ch v? mi?n phí .  Greek:  Unser [facility] DolmetschersCleveland Clinic Avon Hospitalice beantwSaint Francis Medical Centeret Ihren Fragen zu unserem Gesundheits- und Arzneimittelplan. Unsere Dolmetscher erreichen Sie [phone]. Man abril nen blanca auSouth [facility] Horn [facility]. Dieser Service ist hospitals.  Luxembourgish:  ??? ?? ?? ?? ?? ??? ?? ??? ?? ???? ?? ?? ???? ???? ????. ?? ???? ????? ?? [phone] ??? ??? ????.  ???? ?? ???? ?? ?? ????. ? ???? ??? ?????.   Salvadorean: Latonyaè ó âàñ âîçíèêíóò âîïðîñû îòíîñèòåëüíî ñòðàõîâîãî èëè ìåäèêàìåíòíîãî ïëàíà, âû ìîæåòå âîñïîëüçîâàòüñÿ íàøèìè áåñïëàòíûìè óñëóãàìè ïåðåâîä÷èêîâ. ×òîáû âîñïîëüçîâàòüñÿ óñëóãàìè ïåðåâîä÷èêà, ïîçâîíèòå íàì ïî òåëåôîíó [phone]. Âàì îêàæåò ïîìîùü ñîòðóäíèê, êîòîðûé ãîâîðèò ïî-póññêè. Äàííàÿ óñëóãà áåñïëàòíàÿ.  Wolof: ÅääÇ äÞÏã ÎÏãÇÊ ÇáãÊÑÌã ÇáÝæÑí ÇáãÌÇäíÉ ááÅÌÇÈÉ Úä Ãí ÃÓÆáÉ ÊÊÚáÞ ÈÇáÕÍÉ Ãæ ÌÏæá ÇáÃÏæíÉ áÏíäÇ. ááÍÕæá Úáì ãÊÑÌã ÝæÑí¡ áíÓ Úáíß Óæì ÇáÇÊÕÇá ÈäÇ Úáì [phone] . ÓíÞæã ÔÎÕ ãÇ íÊÍÏË ÇáÚÑÈíÉ ÈãÓÇÚÏÊß. åÐå ÎÏãÉ ãÌÇäíÉ.  Gary: ????? ????????? ?? ??? ?? ????? ?? ???? ??? ???? ???? ?? ?????? ?? ???? ???? ?? ??? ????? ??? ????? ???????? ?????? ?????? ???. ?? ???????? ??????? ???? ?? ???, ?? ???? [phone] ?? ??? ????. ??? ??????? ?? ?????? ????? ?? ???? ??? ?? ???? ??. ?? ?? ????? ???? ??.   Italian:  È  disponibile un servizio di interpretariato gratuito per rispondere a eventuali domande sul nostro piano sanitario e farmaceutico. Per un interprete, contattare il duy [phone]. Un nostro incaricato eagle parla Italianovi fornirà l'assistenza necessaria. È un servizio gratuito.  Portugués:  Dispomos de serviços de interpretação gratuitos para responder a qualquer questão que tenha acerca do nosso plano de saúde ou de medicação. Para obter um intérprete, contacte-nos através do número [phone]. Irá encontrar alguém que fale o idioma  Português para o ajudar. Denise serviço é gratuito.  Faroese Creole:  Nou genyen sèvis entèprèt gratis kat reponn tout kesyon ou ta genyen konsènan plan medikal oswa dwòg nou an.  Kat jwenn yon barbara, jis rele nou nan [phone]. Yon moun ki pale Kreyòl kapab christiano w.  Sa a se yon sèvis ki gratis.  Polish:  Umo¿liwiamy bezp³atne skorzystanie z us³ug t³umacza ustnego, który pomo¿e w uzyskaniu odpowiedzi na temat planu zdrowotnego lub dawkowania leków. Minda skorzystaæ z pomocy t³umacza znaj¹cego edgar arauz¿y zadzwoniæ pod numer [phone]. Ta us³uga jest bezp³atna.  Setswana: ????? ??????? ????????????????????? ??????????????????????????????????[phone] ???????????????? ? ????????????????? ?????

## 2025-08-20 ENCOUNTER — OFFICE VISIT (OUTPATIENT)
Dept: MEDICAL GROUP | Facility: LAB | Age: 75
End: 2025-08-20
Payer: MEDICARE

## 2025-08-20 VITALS
DIASTOLIC BLOOD PRESSURE: 84 MMHG | RESPIRATION RATE: 20 BRPM | HEART RATE: 70 BPM | SYSTOLIC BLOOD PRESSURE: 130 MMHG | BODY MASS INDEX: 35.58 KG/M2 | TEMPERATURE: 97.9 F | HEIGHT: 69 IN | OXYGEN SATURATION: 97 % | WEIGHT: 240.2 LBS

## 2025-08-20 DIAGNOSIS — H54.61 VISION LOSS, RIGHT EYE: ICD-10-CM

## 2025-08-20 DIAGNOSIS — E66.812 CLASS 2 SEVERE OBESITY DUE TO EXCESS CALORIES WITH SERIOUS COMORBIDITY AND BODY MASS INDEX (BMI) OF 35.0 TO 35.9 IN ADULT (HCC): ICD-10-CM
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
## 2025-08-20 DIAGNOSIS — E66.01 CLASS 2 SEVERE OBESITY DUE TO EXCESS CALORIES WITH SERIOUS COMORBIDITY AND BODY MASS INDEX (BMI) OF 35.0 TO 35.9 IN ADULT (HCC): ICD-10-CM

## 2025-08-20 DIAGNOSIS — E55.9 VITAMIN D DEFICIENCY: Primary | ICD-10-CM

## 2025-08-20 PROCEDURE — 99214 OFFICE O/P EST MOD 30 MIN: CPT | Performed by: INTERNAL MEDICINE

## 2025-08-20 PROCEDURE — 3075F SYST BP GE 130 - 139MM HG: CPT | Performed by: INTERNAL MEDICINE

## 2025-08-20 PROCEDURE — 3079F DIAST BP 80-89 MM HG: CPT | Performed by: INTERNAL MEDICINE

## 2025-08-20 ASSESSMENT — FIBROSIS 4 INDEX: FIB4 SCORE: 1.68
